# Patient Record
Sex: FEMALE | Race: BLACK OR AFRICAN AMERICAN | NOT HISPANIC OR LATINO
[De-identification: names, ages, dates, MRNs, and addresses within clinical notes are randomized per-mention and may not be internally consistent; named-entity substitution may affect disease eponyms.]

---

## 2020-01-02 ENCOUNTER — APPOINTMENT (OUTPATIENT)
Dept: ENDOCRINOLOGY | Facility: CLINIC | Age: 50
End: 2020-01-02

## 2020-02-03 ENCOUNTER — APPOINTMENT (OUTPATIENT)
Dept: ENDOCRINOLOGY | Facility: CLINIC | Age: 50
End: 2020-02-03
Payer: MEDICAID

## 2020-02-03 VITALS
BODY MASS INDEX: 23.04 KG/M2 | DIASTOLIC BLOOD PRESSURE: 70 MMHG | WEIGHT: 130 LBS | SYSTOLIC BLOOD PRESSURE: 114 MMHG | HEIGHT: 63 IN | HEART RATE: 81 BPM

## 2020-02-03 DIAGNOSIS — E11.9 TYPE 2 DIABETES MELLITUS W/OUT COMPLICATIONS: ICD-10-CM

## 2020-02-03 DIAGNOSIS — E04.2 NONTOXIC MULTINODULAR GOITER: ICD-10-CM

## 2020-02-03 LAB — GLUCOSE BLDC GLUCOMTR-MCNC: 363

## 2020-02-03 PROCEDURE — 83036 HEMOGLOBIN GLYCOSYLATED A1C: CPT | Mod: QW

## 2020-02-03 PROCEDURE — 99205 OFFICE O/P NEW HI 60 MIN: CPT | Mod: 25

## 2020-02-03 PROCEDURE — 82962 GLUCOSE BLOOD TEST: CPT

## 2020-02-04 LAB — HBA1C MFR BLD HPLC: 13.1

## 2020-02-05 NOTE — HISTORY OF PRESENT ILLNESS
[FreeTextEntry1] : 50 y/o F pt, /70, BMI 23.03, with Hx of gestational DM (dx in 2003) and later T2DM (dx in 2005), presents today to establish endocrine care with me.\par Significant PMHx: multiple thyroid nodules (via Dr. 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 However, pt reports Metformin made her very sick. Pt states she took Glipizide and Januvia afterwards however it did not work for her. Pt notes she was given Basaglar ~ 1yr ago, which she was inconsistent with. Pt states she self-DC/ed Basaglar 15u 2 weeks ago 2/2 reactions of "fast heart beat, chest tightness, blurry vision, feeling like I was going to pass out, headache, and dizziness." \par In addition, pt notes she used to get a thyroid nodules "[place] place [place]" where they did a thyroid US. Pt states they did not do a biopsy. 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  However, pt notes she did not take it. \par \par 2/3/20\par Today pt presents for DM f/u with POCT 363, feeling well better with c/o swelling feet which she relates to walking a lot, nocturia 3x, and weight gain 126lb to 130lbs after her vacation.\par Pt DC/ed all medications 10 days ago.\par \par Current Mediations: None

## 2020-02-05 NOTE — ASSESSMENT
[Importance of Diet and Exercise] : importance of diet and exercise to improve glycemic control, achieve weight loss and improve cardiovascular health [Self Monitoring of Blood Glucose] : self monitoring of blood glucose [Action and use of Insulin] : action and use of short and long-acting insulin [FreeTextEntry1] : 50 y/o F pt with:\par 1. Hx of gestational DM (dx in 2003), and later T2DM (dx in 2005):\par Pt has poorly controlled and managed DM. She states she developed side effects to various oral regiments, afterwards she started insulin 1 yr ago. Pt takes insulin inconsistently, and she DC/ed it for the past couple of weeks 2/2 side effects (blurry vision, weakness, chest discomfort). Today her POCT was 363, pt is in glucose toxicity. Recommend pt go to the ER. \par \par 2. Hx of thyroid nodules:\par Pt is being managed by physicians at [facility]. Today's PE showed b/l thyroid nodules, however, pt does not have signs of upper respiratory obstruction. \par \par 3. Hx of Quantiferon - Gold TB, Positive : 1/19/16:\par Pt was managed by Dr. Bae (internist), who recommended INH in 2016. However, pt did not take INH because she was told she does not have Latent TB when she went for a second opinion. Pt will call me with the name of the Doctor who gave her the 2nd opinion. Will email a note to  internist, Dr. Bae\par \par Return in 2 months.

## 2020-02-05 NOTE — PHYSICAL EXAM
[Normal Sclera/Conjunctiva] : normal sclera/conjunctiva [Alert] : alert [Normal Outer Ear/Nose] : the ears and nose were normal in appearance [No Respiratory Distress] : no respiratory distress [Normal Rate] : heart rate was normal  [Clear to Auscultation] : lungs were clear to auscultation bilaterally [Normal S1, S2] : normal S1 and S2 [Regular Rhythm] : with a regular rhythm [Pedal Pulses Normal] : the pedal pulses are present [No Edema] : there was no peripheral edema [Normal Bowel Sounds] : normal bowel sounds [Right Foot Was Examined] : right foot ~C was examined [Spine Straight] : spine straight [Normal Gait] : normal gait [Left Foot Was Examined] : left foot ~C was examined [2+] : 2+ in the dorsalis pedis [Normal Reflexes] : deep tendon reflexes were 2+ and symmetric [Oriented x3] : oriented to person, place, and time [de-identified] : R eye with cataracts, b/l lipemia retinalis, mild microaneurysm  [de-identified] : b/l thyroid nodules

## 2020-02-05 NOTE — END OF VISIT
[Time Spent: ___ minutes] : I have spent [unfilled] minutes of face to face time with the patient [>50% of Time Spent on Counseling for ____] : Greater than 50% of the encounter time was spent on counseling for [unfilled] [FreeTextEntry3] : All medical record entries made by the Scribe were at my, Dr. Mikal Duque, direction and personally dictated by me on 02/03/2020. I have reviewed the chart and agree that the record accurately reflects my personal performance of the history, physical exam, assessment and plan. I have also personally directed, reviewed and agreed with the chart.

## 2020-02-05 NOTE — ADDENDUM
[FreeTextEntry1] : I, Daniel Evans, acted solely as a scribe for Dr. Mikal Duque on this date. 02/03/2020.

## 2023-01-08 NOTE — REVIEW OF SYSTEMS
Pt presents to ED with c/o animal bite to right hand. Pt reports being seen by PCP and instructed to come to ED for rabies vaccine.Pt AAOx4, NAD noted.    [Negative] : Psychiatric [Recent Weight Gain (___ Lbs)] : recent [unfilled] ~Ulb weight gain [Nocturia] : nocturia [As Noted in HPI] : as noted in HPI [FreeTextEntry9] : feet swelling